# Patient Record
Sex: FEMALE | Race: WHITE | NOT HISPANIC OR LATINO | URBAN - METROPOLITAN AREA
[De-identification: names, ages, dates, MRNs, and addresses within clinical notes are randomized per-mention and may not be internally consistent; named-entity substitution may affect disease eponyms.]

---

## 2021-05-25 ENCOUNTER — RECORDS - HEALTHEAST (OUTPATIENT)
Dept: ADMINISTRATIVE | Facility: CLINIC | Age: 86
End: 2021-05-25

## 2025-06-07 ENCOUNTER — HOSPITAL ENCOUNTER (EMERGENCY)
Facility: HOSPITAL | Age: OVER 89
Discharge: HOME | End: 2025-06-07
Attending: EMERGENCY MEDICINE | Admitting: EMERGENCY MEDICINE
Payer: COMMERCIAL

## 2025-06-07 VITALS
HEART RATE: 77 BPM | OXYGEN SATURATION: 97 % | TEMPERATURE: 97 F | SYSTOLIC BLOOD PRESSURE: 164 MMHG | RESPIRATION RATE: 20 BRPM | DIASTOLIC BLOOD PRESSURE: 75 MMHG

## 2025-06-07 DIAGNOSIS — R20.0 PERIORAL NUMBNESS: ICD-10-CM

## 2025-06-07 PROCEDURE — 99283 EMERGENCY DEPT VISIT LOW MDM: CPT

## 2025-06-07 ASSESSMENT — COLUMBIA-SUICIDE SEVERITY RATING SCALE - C-SSRS
2. HAVE YOU ACTUALLY HAD ANY THOUGHTS OF KILLING YOURSELF IN THE PAST MONTH?: NO
1. IN THE PAST MONTH, HAVE YOU WISHED YOU WERE DEAD OR WISHED YOU COULD GO TO SLEEP AND NOT WAKE UP?: NO
6. HAVE YOU EVER DONE ANYTHING, STARTED TO DO ANYTHING, OR PREPARED TO DO ANYTHING TO END YOUR LIFE?: NO

## 2025-06-07 ASSESSMENT — ACTIVITIES OF DAILY LIVING (ADL): ADLS_ACUITY_SCORE: 41

## 2025-06-07 NOTE — DISCHARGE INSTRUCTIONS
I suspect the numbness around your mouth was from anxiety and breathing too fast.  If you start to get anxious again, try and slow breathing deliberately. You otherwise look great and I have no other immediate, emergency concerns. Follow up with primary clinic as needed.

## 2025-06-07 NOTE — ED PROVIDER NOTES
"  Emergency Department Encounter     Evaluation Date & Time:   6/7/2025  5:51 PM    CHIEF COMPLAINT:  lip tingling       Triage Note:The pt presents for evaluation of lip tingling after eating some of her home made soup. She thought she put salt in it, then began to get nervous it was something else she put in it. Lip tingling has resolved. She had no other symptoms. No complaints at this time. She thinks she \"just worked myself up.\"     ED COURSE & MEDICAL DECISION MAKING:     Pt is 89 yo, lives independently and was making food (soup) for first time in [facility]. She sat down to eat, felt like it tasted off, so she became very anxious and concerned that she put something incorrect into soup.  This resulted in more rapid breathing, worsening anxiety and ultimately perioral numbness.  She called 911 and was brought in.  No HA, cp/sob, abd pain, weakness.  Pt back to baseline and has no complaints/concerns. She does report her daughters, who live in [facility], are set for trip to [place] and she's been a little anxious about this.  We discussed doing a workup, but given history and exam, very low suspicion for more nefarious acute process.  She is very comfortable with no additional emergent workup, which I think is very reasonable. Pt will be discharged back to home with family.      ED Course as of 06/07/25 1809   Sat Jun 07, 2025   [id number] I introduced myself to the patient, obtained patient history and performed a physical exam. We discussed plan for discharge as well as supportive cares at home and reasons for return to the ED including new or worsening symptoms. All questions and concerns addressed. Patient to be discharged by RN. They are agreeable and comfortable with plan.         Medical Decision Making      Discharge. No recommendations on prescription strength medication(s). See documentation for any additional details.    MIPS (CTPE, Dental pain, Hernández, Sinusitis, Asthma/COPD, Head Trauma): Not Applicable    SEPSIS: " None          MEDICATIONS GIVEN IN THE EMERGENCY DEPARTMENT:  Medications - No data to display    NEW PRESCRIPTIONS STARTED AT TODAY'S ED VISIT:  New Prescriptions    No medications on file       hospitals   HPI     Carolyn E Cushing is a 89 year old female who presents to this ED via EMS for evaluation of lip tingling.    Patient was making soup at home today and became increasingly anxious that she put the wrong ingredient in. She hasn't cooked for herself in a while and the soup tasted off. She checked her blood pressure and it was high. She then developed numbness and tingling of her lips. She took an ambulance because her daughters weren't picking up their phones. Her symptoms have now resolved, and her only complaint is fatigue. She lives alone. She is ambulating at her baseline. She denies any shortness of breath, chest pain, dysphagia, nausea/vomiting. She denies unilateral weakness or arm tingling/numbness.    REVIEW OF SYSTEMS:  See HPI      Medical History   No past medical history on file.    No past surgical history on file.    No family history on file.    Social History     Tobacco Use    Smoking status: Never    Smokeless tobacco: Never   Substance Use Topics    Alcohol use: Never       No current outpatient medications on file.      Physical Exam     Vitals:  BP (!) 186/91   Pulse 79   Temp 97  F (36.1  C) (Temporal)   Resp 18   SpO2 96%     PHYSICAL EXAM:   Physical Exam  Vitals and nursing note reviewed.   Constitutional:       General: She is not in acute distress.     Appearance: Normal appearance.   HENT:      Head: Normocephalic and atraumatic.      Mouth/Throat:      Mouth: Mucous membranes are moist.   Eyes:      Extraocular Movements: Extraocular movements intact.      Pupils: Pupils are equal, round, and reactive to light.      Comments: No vertical or bidirectional nystagmus   Cardiovascular:      Rate and Rhythm: Normal rate and regular rhythm.   Pulmonary:      Effort: Pulmonary effort is  normal. No respiratory distress.      Breath sounds: Normal breath sounds.   Abdominal:      General: There is no distension.      Palpations: Abdomen is soft.      Tenderness: There is no abdominal tenderness.   Musculoskeletal:         General: Normal range of motion.      Cervical back: Normal range of motion.   Skin:     General: Skin is warm.      Capillary Refill: Capillary refill takes less than 2 seconds.   Neurological:      Mental Status: She is alert and oriented to person, place, and time.      Sensory: Sensation is intact.      Motor: Motor function is intact.      Comments: Fluent speech, no facial asymmetry or pronator drift, 5/5 strength b/l UE/LE, normal finger to nose b/l         Results     LAB:  All pertinent labs reviewed and interpreted  Labs Ordered and Resulted from Time of ED Arrival to Time of ED Departure - No data to display    RADIOLOGY:  No orders to display                FINAL IMPRESSION:    ICD-10-CM    1. Perioral numbness  R20.0           0 minutes of critical care time      I, Mraia Andrade, am serving as a scribe to document services personally performed by Dr. Humberto Pineda, based on my observations and the provider's statements to me. IHumberto, DO attest that Maria Andrade is acting in a scribe capacity, has observed my performance of the services and has documented them in accordance with my direction.      Humberto Pineda DO  Emergency Medicine  [facility] EMERGENCY DEPARTMENT  6/7/2025  5:56 PM          Humberto Pineda MD  06/07/25 8139